# Patient Record
Sex: MALE | Race: WHITE | NOT HISPANIC OR LATINO | Employment: FULL TIME | URBAN - METROPOLITAN AREA
[De-identification: names, ages, dates, MRNs, and addresses within clinical notes are randomized per-mention and may not be internally consistent; named-entity substitution may affect disease eponyms.]

---

## 2023-05-29 DIAGNOSIS — K21.9 GASTROESOPHAGEAL REFLUX DISEASE, UNSPECIFIED WHETHER ESOPHAGITIS PRESENT: Primary | ICD-10-CM

## 2023-05-30 RX ORDER — OMEPRAZOLE 40 MG/1
CAPSULE, DELAYED RELEASE ORAL
Qty: 30 CAPSULE | Refills: 5 | Status: SHIPPED | OUTPATIENT
Start: 2023-05-30 | End: 2023-11-28

## 2023-06-12 ENCOUNTER — OFFICE VISIT (OUTPATIENT)
Dept: PRIMARY CARE | Facility: CLINIC | Age: 43
End: 2023-06-12
Payer: COMMERCIAL

## 2023-06-12 ENCOUNTER — APPOINTMENT (OUTPATIENT)
Dept: PRIMARY CARE | Facility: CLINIC | Age: 43
End: 2023-06-12
Payer: COMMERCIAL

## 2023-06-12 VITALS
OXYGEN SATURATION: 94 % | TEMPERATURE: 97.4 F | DIASTOLIC BLOOD PRESSURE: 79 MMHG | BODY MASS INDEX: 33.01 KG/M2 | SYSTOLIC BLOOD PRESSURE: 122 MMHG | WEIGHT: 233.38 LBS | HEART RATE: 79 BPM

## 2023-06-12 DIAGNOSIS — J20.9 ACUTE BRONCHITIS, UNSPECIFIED ORGANISM: Primary | ICD-10-CM

## 2023-06-12 PROBLEM — N50.89 TESTICULAR MASS: Status: ACTIVE | Noted: 2023-06-12

## 2023-06-12 PROBLEM — R13.10 DYSPHAGIA: Status: ACTIVE | Noted: 2023-06-12

## 2023-06-12 PROBLEM — R06.09 DYSPNEA ON EXERTION: Status: ACTIVE | Noted: 2023-06-12

## 2023-06-12 PROBLEM — R53.83 FATIGUE: Status: ACTIVE | Noted: 2023-06-12

## 2023-06-12 PROBLEM — K21.9 GE REFLUX: Status: ACTIVE | Noted: 2023-06-12

## 2023-06-12 PROBLEM — U07.1 COVID-19: Status: ACTIVE | Noted: 2023-06-12

## 2023-06-12 PROBLEM — R10.30 GROIN PAIN, UNSPECIFIED LATERALITY: Status: ACTIVE | Noted: 2023-06-12

## 2023-06-12 PROBLEM — J02.9 PHARYNGITIS: Status: ACTIVE | Noted: 2023-06-12

## 2023-06-12 PROBLEM — L98.9 CHANGING SKIN LESION: Status: ACTIVE | Noted: 2023-06-12

## 2023-06-12 PROBLEM — U07.1 COVID-19: Status: RESOLVED | Noted: 2023-06-12 | Resolved: 2023-06-12

## 2023-06-12 PROBLEM — R94.31 ABNORMAL EKG: Status: ACTIVE | Noted: 2023-06-12

## 2023-06-12 PROBLEM — L03.011 CELLULITIS OF RIGHT MIDDLE FINGER: Status: ACTIVE | Noted: 2023-06-12

## 2023-06-12 PROCEDURE — 99213 OFFICE O/P EST LOW 20 MIN: CPT | Performed by: NURSE PRACTITIONER

## 2023-06-12 PROCEDURE — 1036F TOBACCO NON-USER: CPT | Performed by: NURSE PRACTITIONER

## 2023-06-12 RX ORDER — CETIRIZINE HYDROCHLORIDE 10 MG/1
10 TABLET ORAL DAILY
Qty: 30 TABLET | Refills: 0 | Status: SHIPPED | OUTPATIENT
Start: 2023-06-12 | End: 2023-07-05

## 2023-06-12 RX ORDER — BENZONATATE 200 MG/1
200 CAPSULE ORAL 3 TIMES DAILY PRN
Qty: 42 CAPSULE | Refills: 0 | Status: SHIPPED | OUTPATIENT
Start: 2023-06-12 | End: 2023-07-12

## 2023-06-12 RX ORDER — ALBUTEROL SULFATE 90 UG/1
2 AEROSOL, METERED RESPIRATORY (INHALATION) EVERY 4 HOURS PRN
Qty: 8 G | Refills: 0 | Status: SHIPPED | OUTPATIENT
Start: 2023-06-12 | End: 2023-07-05

## 2023-06-12 RX ORDER — MULTIVITAMIN
1 TABLET ORAL DAILY
COMMUNITY
Start: 2021-07-15

## 2023-06-12 RX ORDER — AZITHROMYCIN 250 MG/1
TABLET, FILM COATED ORAL
Qty: 6 TABLET | Refills: 0 | Status: SHIPPED | OUTPATIENT
Start: 2023-06-12 | End: 2023-06-17

## 2023-06-12 ASSESSMENT — ENCOUNTER SYMPTOMS
SHORTNESS OF BREATH: 0
SINUS PRESSURE: 0
ABDOMINAL PAIN: 0
FATIGUE: 0
VOMITING: 0
COUGH: 0
NAUSEA: 0
SINUS PAIN: 0
FEVER: 0
DIARRHEA: 0
RHINORRHEA: 1
CHILLS: 0

## 2023-06-12 NOTE — PROGRESS NOTES
Subjective   Chief Complaint: URI (For 1 week. Negative COVID 6/12).    HPI   Mitch Ricks is a 42 y.o. male who presents for URI (For 1 week. Negative COVID 6/12).    Patient presents with nasal congestion, chest congestion, cough, sinus pressure.     Some SOB with exertion and chest tightness.  Patient denies fever, chills, nausea, vomiting, diarrhea, chest pain, heart palpations, or shortness of breath.     OTC has been using dayquil with minimal relief       Review of Systems   Constitutional:  Negative for chills, fatigue and fever.   HENT:  Positive for congestion and rhinorrhea. Negative for ear discharge, ear pain, postnasal drip, sinus pressure and sinus pain.    Respiratory:  Negative for cough and shortness of breath.    Cardiovascular:  Negative for chest pain.   Gastrointestinal:  Negative for abdominal pain, diarrhea, nausea and vomiting.       Objective   /79   Pulse 79   Temp 36.3 °C (97.4 °F) (Temporal)   Wt 106 kg (233 lb 6 oz)   SpO2 94%   BMI 33.01 kg/m²   BSA Body surface area is 2.3 meters squared.      Physical Exam  Constitutional:       Appearance: Normal appearance.   HENT:      Right Ear: Tympanic membrane normal.      Left Ear: Tympanic membrane normal.      Mouth/Throat:      Mouth: Mucous membranes are moist.      Pharynx: Posterior oropharyngeal erythema present. No oropharyngeal exudate.   Cardiovascular:      Rate and Rhythm: Normal rate and regular rhythm.   Pulmonary:      Effort: Pulmonary effort is normal.      Breath sounds: Wheezing present.   Abdominal:      General: Abdomen is flat.      Palpations: Abdomen is soft.   Neurological:      Mental Status: He is alert.       No visits with results within 1 Year(s) from this visit.   Latest known visit with results is:   [facility] Encounter on 01/07/2022   Component Date Value Ref Range Status    Testosterone 01/07/2022 421  240 - 1,000 ng/dL Final    Comment:  Nandrolone decanoate, 11 Beta-hydroxytestosterone,  androstenedione,   testosterone propionate and 11-keto-testosterone strongly cross    react with this test method.    Biotin interference may cause falsely elevated results. Patients   taking a Biotin dose of up to 5 mg/day should refrain from taking   Biotin for 24 hours before sample collection. Providers may contact   their local laboratory for further information.       Current Outpatient Medications on File Prior to Visit   Medication Sig Dispense Refill    multivitamin (Multiple Vitamins) tablet Take 1 tablet by mouth once daily.      omeprazole (PriLOSEC) 40 mg DR capsule TAKE 1 CAPSULE BY MOUTH EVERY DAY 30 capsule 5     No current facility-administered medications on file prior to visit.     No images are attached to the encounter.            Assessment/Plan   Problem List Items Addressed This Visit          Respiratory    Acute bronchitis - Primary     - Antibiotic sent to pharmacy- may start if symptoms persist in 2 days   - Albuterol inhaler sent to pharmacy   - Tessalon Perles sent to pharmacy  - Cool mist humidifier at nighttime    - Advised patient to push fluids   - patient to call if develops new or worsening symptoms          Relevant Medications    albuterol (Ventolin HFA) 90 mcg/actuation inhaler    azithromycin (Zithromax) 250 mg tablet    cetirizine (ZyrTEC) 10 mg tablet    benzonatate (Tessalon) 200 mg capsule

## 2023-06-12 NOTE — ASSESSMENT & PLAN NOTE
- Antibiotic sent to pharmacy- may start if symptoms persist in 2 days   - Albuterol inhaler sent to pharmacy   - Tessalon Perles sent to pharmacy  - Cool mist humidifier at nighttime    - Advised patient to push fluids   - patient to call if develops new or worsening symptoms

## 2023-07-04 DIAGNOSIS — J20.9 ACUTE BRONCHITIS, UNSPECIFIED ORGANISM: ICD-10-CM

## 2023-07-05 RX ORDER — ALBUTEROL SULFATE 90 UG/1
AEROSOL, METERED RESPIRATORY (INHALATION)
Qty: 6.7 G | Refills: 1 | Status: SHIPPED | OUTPATIENT
Start: 2023-07-05

## 2023-07-05 RX ORDER — CETIRIZINE HYDROCHLORIDE 10 MG/1
TABLET ORAL
Qty: 30 TABLET | Refills: 0 | Status: SHIPPED | OUTPATIENT
Start: 2023-07-05 | End: 2023-08-07

## 2023-08-06 DIAGNOSIS — J20.9 ACUTE BRONCHITIS, UNSPECIFIED ORGANISM: ICD-10-CM

## 2023-08-07 RX ORDER — CETIRIZINE HYDROCHLORIDE 10 MG/1
TABLET ORAL
Qty: 30 TABLET | Refills: 2 | Status: SHIPPED | OUTPATIENT
Start: 2023-08-07 | End: 2023-10-13

## 2023-10-12 DIAGNOSIS — J20.9 ACUTE BRONCHITIS, UNSPECIFIED ORGANISM: ICD-10-CM

## 2023-10-12 RX ORDER — HYDROCODONE BITARTRATE AND ACETAMINOPHEN 5; 325 MG/1; MG/1
1 TABLET ORAL EVERY 6 HOURS PRN
COMMUNITY
Start: 2023-10-12 | End: 2023-10-15

## 2023-10-13 RX ORDER — CETIRIZINE HYDROCHLORIDE 10 MG/1
TABLET ORAL
Qty: 90 TABLET | Refills: 1 | Status: SHIPPED | OUTPATIENT
Start: 2023-10-13

## 2023-11-28 DIAGNOSIS — K21.9 GASTROESOPHAGEAL REFLUX DISEASE, UNSPECIFIED WHETHER ESOPHAGITIS PRESENT: ICD-10-CM

## 2023-11-28 RX ORDER — OMEPRAZOLE 40 MG/1
CAPSULE, DELAYED RELEASE ORAL
Qty: 90 CAPSULE | Refills: 2 | Status: SHIPPED | OUTPATIENT
Start: 2023-11-28

## 2024-04-22 ENCOUNTER — TELEPHONE (OUTPATIENT)
Dept: PRIMARY CARE | Facility: CLINIC | Age: 44
End: 2024-04-22
Payer: COMMERCIAL

## 2024-04-22 DIAGNOSIS — Z00.00 HEALTH MAINTENANCE EXAMINATION: ICD-10-CM

## 2024-05-07 ENCOUNTER — OFFICE VISIT (OUTPATIENT)
Dept: PRIMARY CARE | Facility: CLINIC | Age: 44
End: 2024-05-07
Payer: COMMERCIAL

## 2024-05-07 ENCOUNTER — HOSPITAL ENCOUNTER (OUTPATIENT)
Dept: RADIOLOGY | Facility: CLINIC | Age: 44
Discharge: HOME | End: 2024-05-07
Payer: COMMERCIAL

## 2024-05-07 ENCOUNTER — LAB (OUTPATIENT)
Dept: LAB | Facility: LAB | Age: 44
End: 2024-05-07
Payer: COMMERCIAL

## 2024-05-07 VITALS
SYSTOLIC BLOOD PRESSURE: 118 MMHG | TEMPERATURE: 97.3 F | HEART RATE: 89 BPM | WEIGHT: 231 LBS | HEIGHT: 71 IN | DIASTOLIC BLOOD PRESSURE: 78 MMHG | BODY MASS INDEX: 32.34 KG/M2 | OXYGEN SATURATION: 95 %

## 2024-05-07 DIAGNOSIS — C62.12 MALIGNANT NEOPLASM OF DESCENDED LEFT TESTIS (MULTI): ICD-10-CM

## 2024-05-07 DIAGNOSIS — R10.30 GROIN PAIN, UNSPECIFIED LATERALITY: ICD-10-CM

## 2024-05-07 DIAGNOSIS — C62.12 MALIGNANT NEOPLASM OF DESCENDED LEFT TESTIS (MULTI): Primary | ICD-10-CM

## 2024-05-07 DIAGNOSIS — R10.84 GENERALIZED ABDOMINAL PAIN: ICD-10-CM

## 2024-05-07 PROBLEM — N50.89 TESTICULAR MASS: Status: RESOLVED | Noted: 2023-06-12 | Resolved: 2024-05-07

## 2024-05-07 PROCEDURE — 81003 URINALYSIS AUTO W/O SCOPE: CPT

## 2024-05-07 PROCEDURE — 76870 US EXAM SCROTUM: CPT

## 2024-05-07 PROCEDURE — 99213 OFFICE O/P EST LOW 20 MIN: CPT | Performed by: FAMILY MEDICINE

## 2024-05-07 ASSESSMENT — ENCOUNTER SYMPTOMS
DYSURIA: 0
CHOKING: 0
SPEECH DIFFICULTY: 0
ACTIVITY CHANGE: 0
DIARRHEA: 0
TREMORS: 0
FATIGUE: 0
WOUND: 0
ARTHRALGIAS: 0
CHILLS: 0
FLANK PAIN: 0
CARDIOVASCULAR NEGATIVE: 1
ABDOMINAL PAIN: 1
DEPRESSION: 0
LOSS OF SENSATION IN FEET: 0
NECK PAIN: 0
FREQUENCY: 0
ROS GI COMMENTS: SOFT STOOL.
OCCASIONAL FEELINGS OF UNSTEADINESS: 0
SHORTNESS OF BREATH: 0

## 2024-05-07 ASSESSMENT — PATIENT HEALTH QUESTIONNAIRE - PHQ9
10. IF YOU CHECKED OFF ANY PROBLEMS, HOW DIFFICULT HAVE THESE PROBLEMS MADE IT FOR YOU TO DO YOUR WORK, TAKE CARE OF THINGS AT HOME, OR GET ALONG WITH OTHER PEOPLE: NOT DIFFICULT AT ALL
SUM OF ALL RESPONSES TO PHQ9 QUESTIONS 1 AND 2: 0
1. LITTLE INTEREST OR PLEASURE IN DOING THINGS: NOT AT ALL
2. FEELING DOWN, DEPRESSED OR HOPELESS: NOT AT ALL

## 2024-05-07 NOTE — PATIENT INSTRUCTIONS
Evaluating for testicular pain ultrasound of the testicle being performed.    Because of the generalized abdominal pain discomfort we are going to do CT abdomen pelvis with contrast.    Urinalysis CMP CBC amylase lipase going to be performed.

## 2024-05-07 NOTE — PROGRESS NOTES
"Subjective   Patient ID: Mitch Ricks is a 43 y.o. male who presents for Groin Pain (Abdominal bloating).    Patient with history of testicular cancer has some abdominal bloating and discomfort.  Patient had bloating discomfort and generalized abdominal discomfort in the suprapubic area.  Patient had history of testicular cancer.    He has noticed a little bit of fullness in the area around the left testicle.    He had no troubles with significant pain or discomfort there is no redness no warmth.  Patient's bowel movements have change he is having soft stools approximately 2-3 times daily normally goes once a day with formed stool there is no change in diet.  He is get a good appetite.  There is been no blood in stool that he had noted.        Last month .  Pain in the area.    2 cycle donald boost.  Soft stool.  Last month.    No varibles.    No antibiotics.      Low energy,    Groin Pain  The patient's pertinent negatives include no penile discharge. Associated symptoms include abdominal pain. Pertinent negatives include no chills, diarrhea, dysuria, flank pain, frequency or shortness of breath.        Review of Systems   Constitutional:  Negative for activity change, chills and fatigue.   Respiratory:  Negative for choking and shortness of breath.    Cardiovascular: Negative.    Gastrointestinal:  Positive for abdominal pain. Negative for diarrhea.        Soft stool.       Genitourinary:  Negative for dysuria, flank pain, frequency and penile discharge.   Musculoskeletal:  Negative for arthralgias and neck pain.   Skin:  Negative for pallor and wound.   Neurological:  Negative for tremors and speech difficulty.       Objective   /78   Pulse 89   Temp 36.3 °C (97.3 °F)   Ht 1.803 m (5' 11\")   Wt 105 kg (231 lb)   SpO2 95%   BMI 32.22 kg/m²   BSA Body surface area is 2.29 meters squared.      Physical Exam  Constitutional:       Appearance: Normal appearance.   HENT:      Head: Normocephalic and " atraumatic.      Right Ear: Tympanic membrane normal.      Left Ear: Tympanic membrane normal.      Nose: Nose normal.   Eyes:      Pupils: Pupils are equal, round, and reactive to light.   Cardiovascular:      Rate and Rhythm: Normal rate and regular rhythm.   Pulmonary:      Effort: Pulmonary effort is normal.      Breath sounds: Normal breath sounds.   Abdominal:      General: There is no distension.      Tenderness: There is no abdominal tenderness.      Comments: Abdomen soft bowel sounds are present no tenderness noted.  Some tenderness noted just in the inferior aspect of left testicle.    No redness or warmth noted.    Left inguinal hernia noted easily reducible.  The abdomen soft flat bowel sounds present   Musculoskeletal:      Cervical back: Normal range of motion.   Neurological:      Mental Status: He is alert.       No visits with results within 1 Year(s) from this visit.   Latest known visit with results is:   [facility] Encounter on 01/07/2022   Component Date Value Ref Range Status    Testosterone 01/07/2022 421  240 - 1,000 ng/dL Final    Comment:  Nandrolone decanoate, 11 Beta-hydroxytestosterone, androstenedione,   testosterone propionate and 11-keto-testosterone strongly cross    react with this test method.    Biotin interference may cause falsely elevated results. Patients   taking a Biotin dose of up to 5 mg/day should refrain from taking   Biotin for 24 hours before sample collection. Providers may contact   their local laboratory for further information.       Current Outpatient Medications on File Prior to Visit   Medication Sig Dispense Refill    albuterol 90 mcg/actuation inhaler INHALE 2 PUFFS BY MOUTH EVERY 4 HOURS IF NEEDED FOR WHEEZING OR SHORTNESS OF BREATH 6.7 g 1    cetirizine (ZyrTEC) 10 mg tablet TAKE 1 TABLET BY MOUTH EVERY DAY 90 tablet 1    multivitamin (Multiple Vitamins) tablet Take 1 tablet by mouth once daily.      omeprazole (PriLOSEC) 40 mg DR capsule TAKE 1 CAPSULE BY  MOUTH EVERY DAY 90 capsule 2     No current facility-administered medications on file prior to visit.     No images are attached to the encounter.            Assessment/Plan   Problem List Items Addressed This Visit             ICD-10-CM    Groin pain, unspecified laterality R10.30    Malignant neoplasm of descended left testis (Multi) - Primary C62.12    Generalized abdominal pain R10.84

## 2024-05-08 ENCOUNTER — LAB (OUTPATIENT)
Dept: LAB | Facility: LAB | Age: 44
End: 2024-05-08
Payer: COMMERCIAL

## 2024-05-08 DIAGNOSIS — R10.84 GENERALIZED ABDOMINAL PAIN: ICD-10-CM

## 2024-05-08 DIAGNOSIS — Z00.00 HEALTH MAINTENANCE EXAMINATION: ICD-10-CM

## 2024-05-08 LAB
ALBUMIN SERPL BCP-MCNC: 4.3 G/DL (ref 3.4–5)
ALP SERPL-CCNC: 51 U/L (ref 33–120)
ALT SERPL W P-5'-P-CCNC: 30 U/L (ref 10–52)
AMYLASE SERPL-CCNC: 44 U/L (ref 29–103)
ANION GAP SERPL CALC-SCNC: 14 MMOL/L (ref 10–20)
APPEARANCE UR: CLEAR
AST SERPL W P-5'-P-CCNC: 25 U/L (ref 9–39)
BASOPHILS # BLD AUTO: 0.04 X10*3/UL (ref 0–0.1)
BASOPHILS NFR BLD AUTO: 0.5 %
BILIRUB SERPL-MCNC: 0.5 MG/DL (ref 0–1.2)
BILIRUB UR STRIP.AUTO-MCNC: NEGATIVE MG/DL
BUN SERPL-MCNC: 16 MG/DL (ref 6–23)
CALCIUM SERPL-MCNC: 9.3 MG/DL (ref 8.6–10.6)
CHLORIDE SERPL-SCNC: 104 MMOL/L (ref 98–107)
CHOLEST SERPL-MCNC: 156 MG/DL (ref 0–199)
CHOLESTEROL/HDL RATIO: 3.6
CO2 SERPL-SCNC: 26 MMOL/L (ref 21–32)
COLOR UR: YELLOW
CREAT SERPL-MCNC: 0.98 MG/DL (ref 0.5–1.3)
EGFRCR SERPLBLD CKD-EPI 2021: >90 ML/MIN/1.73M*2
EOSINOPHIL # BLD AUTO: 0.14 X10*3/UL (ref 0–0.7)
EOSINOPHIL NFR BLD AUTO: 1.8 %
ERYTHROCYTE [DISTWIDTH] IN BLOOD BY AUTOMATED COUNT: 13.1 % (ref 11.5–14.5)
GLUCOSE SERPL-MCNC: 95 MG/DL (ref 74–99)
GLUCOSE UR STRIP.AUTO-MCNC: NORMAL MG/DL
HCT VFR BLD AUTO: 44 % (ref 41–52)
HDLC SERPL-MCNC: 43.6 MG/DL
HGB BLD-MCNC: 14.8 G/DL (ref 13.5–17.5)
IMM GRANULOCYTES # BLD AUTO: 0.04 X10*3/UL (ref 0–0.7)
IMM GRANULOCYTES NFR BLD AUTO: 0.5 % (ref 0–0.9)
KETONES UR STRIP.AUTO-MCNC: NEGATIVE MG/DL
LDLC SERPL CALC-MCNC: 85 MG/DL
LEUKOCYTE ESTERASE UR QL STRIP.AUTO: NEGATIVE
LIPASE SERPL-CCNC: 24 U/L (ref 9–82)
LYMPHOCYTES # BLD AUTO: 2.14 X10*3/UL (ref 1.2–4.8)
LYMPHOCYTES NFR BLD AUTO: 27.8 %
MCH RBC QN AUTO: 28.3 PG (ref 26–34)
MCHC RBC AUTO-ENTMCNC: 33.6 G/DL (ref 32–36)
MCV RBC AUTO: 84 FL (ref 80–100)
MONOCYTES # BLD AUTO: 0.74 X10*3/UL (ref 0.1–1)
MONOCYTES NFR BLD AUTO: 9.6 %
NEUTROPHILS # BLD AUTO: 4.6 X10*3/UL (ref 1.2–7.7)
NEUTROPHILS NFR BLD AUTO: 59.8 %
NITRITE UR QL STRIP.AUTO: NEGATIVE
NON HDL CHOLESTEROL: 112 MG/DL (ref 0–149)
NRBC BLD-RTO: 0 /100 WBCS (ref 0–0)
PH UR STRIP.AUTO: 6 [PH]
PLATELET # BLD AUTO: 258 X10*3/UL (ref 150–450)
POTASSIUM SERPL-SCNC: 4.3 MMOL/L (ref 3.5–5.3)
PROT SERPL-MCNC: 7 G/DL (ref 6.4–8.2)
PROT UR STRIP.AUTO-MCNC: NEGATIVE MG/DL
PSA SERPL-MCNC: 1.74 NG/ML
RBC # BLD AUTO: 5.23 X10*6/UL (ref 4.5–5.9)
RBC # UR STRIP.AUTO: NEGATIVE /UL
SODIUM SERPL-SCNC: 140 MMOL/L (ref 136–145)
SP GR UR STRIP.AUTO: 1.02
TRIGL SERPL-MCNC: 139 MG/DL (ref 0–149)
TSH SERPL-ACNC: 2.94 MIU/L (ref 0.44–3.98)
UROBILINOGEN UR STRIP.AUTO-MCNC: NORMAL MG/DL
VLDL: 28 MG/DL (ref 0–40)
WBC # BLD AUTO: 7.7 X10*3/UL (ref 4.4–11.3)

## 2024-05-08 PROCEDURE — 82150 ASSAY OF AMYLASE: CPT

## 2024-05-08 PROCEDURE — 83690 ASSAY OF LIPASE: CPT

## 2024-05-08 PROCEDURE — 84153 ASSAY OF PSA TOTAL: CPT

## 2024-05-08 PROCEDURE — 80053 COMPREHEN METABOLIC PANEL: CPT

## 2024-05-08 PROCEDURE — 36415 COLL VENOUS BLD VENIPUNCTURE: CPT

## 2024-05-08 PROCEDURE — 85025 COMPLETE CBC W/AUTO DIFF WBC: CPT

## 2024-05-08 PROCEDURE — 80061 LIPID PANEL: CPT

## 2024-05-08 PROCEDURE — 84443 ASSAY THYROID STIM HORMONE: CPT

## 2024-05-22 ENCOUNTER — HOSPITAL ENCOUNTER (OUTPATIENT)
Dept: RADIOLOGY | Facility: CLINIC | Age: 44
Discharge: HOME | End: 2024-05-22
Payer: COMMERCIAL

## 2024-05-22 DIAGNOSIS — R10.84 GENERALIZED ABDOMINAL PAIN: ICD-10-CM

## 2024-05-22 PROCEDURE — 74177 CT ABD & PELVIS W/CONTRAST: CPT | Performed by: RADIOLOGY

## 2024-05-22 PROCEDURE — 74177 CT ABD & PELVIS W/CONTRAST: CPT

## 2024-05-22 PROCEDURE — 2550000001 HC RX 255 CONTRASTS: Performed by: FAMILY MEDICINE

## 2024-05-22 RX ADMIN — IOHEXOL 75 ML: 350 INJECTION, SOLUTION INTRAVENOUS at 08:30

## 2024-08-27 ENCOUNTER — APPOINTMENT (OUTPATIENT)
Dept: PRIMARY CARE | Facility: CLINIC | Age: 44
End: 2024-08-27
Payer: COMMERCIAL

## 2024-08-27 VITALS
HEIGHT: 71 IN | BODY MASS INDEX: 31.36 KG/M2 | DIASTOLIC BLOOD PRESSURE: 76 MMHG | SYSTOLIC BLOOD PRESSURE: 118 MMHG | WEIGHT: 224 LBS | TEMPERATURE: 97.1 F | OXYGEN SATURATION: 97 % | HEART RATE: 82 BPM

## 2024-08-27 DIAGNOSIS — Z00.00 ANNUAL PHYSICAL EXAM: Primary | ICD-10-CM

## 2024-08-27 DIAGNOSIS — R10.30 GROIN PAIN, UNSPECIFIED LATERALITY: ICD-10-CM

## 2024-08-27 DIAGNOSIS — K21.9 GASTROESOPHAGEAL REFLUX DISEASE, UNSPECIFIED WHETHER ESOPHAGITIS PRESENT: ICD-10-CM

## 2024-08-27 DIAGNOSIS — F43.20 ADJUSTMENT DISORDER, UNSPECIFIED TYPE: ICD-10-CM

## 2024-08-27 DIAGNOSIS — C62.12 MALIGNANT NEOPLASM OF DESCENDED LEFT TESTIS (MULTI): ICD-10-CM

## 2024-08-27 PROBLEM — R06.09 DYSPNEA ON EXERTION: Status: RESOLVED | Noted: 2023-06-12 | Resolved: 2024-08-27

## 2024-08-27 PROBLEM — J02.9 PHARYNGITIS: Status: RESOLVED | Noted: 2023-06-12 | Resolved: 2024-08-27

## 2024-08-27 PROBLEM — J20.9 ACUTE BRONCHITIS: Status: RESOLVED | Noted: 2023-06-12 | Resolved: 2024-08-27

## 2024-08-27 PROBLEM — R10.84 GENERALIZED ABDOMINAL PAIN: Status: RESOLVED | Noted: 2024-05-07 | Resolved: 2024-08-27

## 2024-08-27 PROBLEM — R53.83 FATIGUE: Status: RESOLVED | Noted: 2023-06-12 | Resolved: 2024-08-27

## 2024-08-27 PROBLEM — R13.10 DYSPHAGIA: Status: RESOLVED | Noted: 2023-06-12 | Resolved: 2024-08-27

## 2024-08-27 PROCEDURE — 3008F BODY MASS INDEX DOCD: CPT | Performed by: FAMILY MEDICINE

## 2024-08-27 PROCEDURE — 99396 PREV VISIT EST AGE 40-64: CPT | Performed by: FAMILY MEDICINE

## 2024-08-27 ASSESSMENT — ENCOUNTER SYMPTOMS
ARTHRALGIAS: 0
NERVOUS/ANXIOUS: 0
CONFUSION: 0
ACTIVITY CHANGE: 0
DIFFICULTY URINATING: 0
POLYDIPSIA: 0
LOSS OF SENSATION IN FEET: 0
NEUROLOGICAL NEGATIVE: 1
FACIAL ASYMMETRY: 0
DIAPHORESIS: 0
BRUISES/BLEEDS EASILY: 0
AGITATION: 0
PALPITATIONS: 0
FLANK PAIN: 0
NAUSEA: 0
SORE THROAT: 0
NUMBNESS: 0
OCCASIONAL FEELINGS OF UNSTEADINESS: 0
ABDOMINAL DISTENTION: 0
STRIDOR: 0
WOUND: 0
SINUS PAIN: 0
COUGH: 0
EYE REDNESS: 0
EYE ITCHING: 0
SEIZURES: 0
CONSTIPATION: 0
EYE PAIN: 0
HEADACHES: 0
SLEEP DISTURBANCE: 0
DIZZINESS: 0
APPETITE CHANGE: 0
EYE DISCHARGE: 0
SPEECH DIFFICULTY: 0
CHEST TIGHTNESS: 0
VOMITING: 0
DEPRESSION: 0
DYSURIA: 0
UNEXPECTED WEIGHT CHANGE: 0
TROUBLE SWALLOWING: 0
MYALGIAS: 0
BACK PAIN: 0
ABDOMINAL PAIN: 0
ADENOPATHY: 0
LIGHT-HEADEDNESS: 0
HALLUCINATIONS: 0
RESPIRATORY NEGATIVE: 1
DECREASED CONCENTRATION: 0
FREQUENCY: 0
FATIGUE: 0
VOICE CHANGE: 0
BLOOD IN STOOL: 0
SHORTNESS OF BREATH: 0
DIARRHEA: 0
CARDIOVASCULAR NEGATIVE: 1
ANAL BLEEDING: 0
EYES NEGATIVE: 1
SINUS PRESSURE: 0

## 2024-08-27 ASSESSMENT — PATIENT HEALTH QUESTIONNAIRE - PHQ9
9. THOUGHTS THAT YOU WOULD BE BETTER OFF DEAD, OR OF HURTING YOURSELF: NOT AT ALL
8. MOVING OR SPEAKING SO SLOWLY THAT OTHER PEOPLE COULD HAVE NOTICED. OR THE OPPOSITE, BEING SO FIGETY OR RESTLESS THAT YOU HAVE BEEN MOVING AROUND A LOT MORE THAN USUAL: NOT AT ALL
7. TROUBLE CONCENTRATING ON THINGS, SUCH AS READING THE NEWSPAPER OR WATCHING TELEVISION: MORE THAN HALF THE DAYS
4. FEELING TIRED OR HAVING LITTLE ENERGY: MORE THAN HALF THE DAYS
5. POOR APPETITE OR OVEREATING: NOT AT ALL
6. FEELING BAD ABOUT YOURSELF - OR THAT YOU ARE A FAILURE OR HAVE LET YOURSELF OR YOUR FAMILY DOWN: NOT AT ALL
SUM OF ALL RESPONSES TO PHQ9 QUESTIONS 1 AND 2: 3
SUM OF ALL RESPONSES TO PHQ QUESTIONS 1-9: 7
10. IF YOU CHECKED OFF ANY PROBLEMS, HOW DIFFICULT HAVE THESE PROBLEMS MADE IT FOR YOU TO DO YOUR WORK, TAKE CARE OF THINGS AT HOME, OR GET ALONG WITH OTHER PEOPLE: SOMEWHAT DIFFICULT
1. LITTLE INTEREST OR PLEASURE IN DOING THINGS: MORE THAN HALF THE DAYS
2. FEELING DOWN, DEPRESSED OR HOPELESS: SEVERAL DAYS
3. TROUBLE FALLING OR STAYING ASLEEP OR SLEEPING TOO MUCH: NOT AT ALL

## 2024-08-27 ASSESSMENT — COLUMBIA-SUICIDE SEVERITY RATING SCALE - C-SSRS
6. HAVE YOU EVER DONE ANYTHING, STARTED TO DO ANYTHING, OR PREPARED TO DO ANYTHING TO END YOUR LIFE?: NO
1. IN THE PAST MONTH, HAVE YOU WISHED YOU WERE DEAD OR WISHED YOU COULD GO TO SLEEP AND NOT WAKE UP?: NO
2. HAVE YOU ACTUALLY HAD ANY THOUGHTS OF KILLING YOURSELF?: NO

## 2024-08-27 NOTE — PATIENT INSTRUCTIONS
Overall doing well.    Would like to know how you are doing in the next 1 to 2 months with energy with clarity of thought.    If any troubles with depression or anxiety please call and let me know.    Labs have been reviewed with you.    May try turmeric or glucosamine/chondroitin to try with joint pain discomfort.  If not improving please let me know.    COVID has caused issues with brain fog and if should have continued trouble we will have you see one of the long haul COVID clinic.

## 2024-08-27 NOTE — PROGRESS NOTES
Subjective   Patient ID: Mitch Ricks is a 44 y.o. male who presents for Annual Exam.    Things are alright.  Patient has had some issue after COVID which is not thinking quite as clearly describes as a brain fog.  It is getting better.    Feels like it is caused him to be in a little bit of a right for short period but he feels better now he is exercising regularly watching diet closely does not drink alcohol on any kind of regular basis.  He said no fever no chills no night sweats.    Patient did follow-up remotely with Dr. Peterson the urologist felt that the kidney cyst was stable did not need to be seen but he is can make an appointment to see him anyway he is his urologist.  He has had history of testicular cancer    Some memory fog..    Little rut.  Out of                  Alcohol intake: none  Caffeine intake: Shake in am  Exercise: sprints,  high intensity work out. Some strengthening    Using inhaler    Last Colonoscopy: In the next year  Last Pap smear: N/A  Mammogram:N/A  Last Dexa scan:N/A    Shingles vaccine: N/A  TdaP vaccine:     Review of Systems   Constitutional:  Negative for activity change, appetite change, diaphoresis, fatigue and unexpected weight change.   HENT: Negative.  Negative for congestion, dental problem, ear discharge, ear pain, hearing loss, mouth sores, nosebleeds, postnasal drip, sinus pressure, sinus pain, sore throat, tinnitus, trouble swallowing and voice change.    Eyes: Negative.  Negative for pain, discharge, redness, itching and visual disturbance.        Wearing glasses   Respiratory: Negative.  Negative for cough, chest tightness, shortness of breath and stridor.    Cardiovascular: Negative.  Negative for chest pain, palpitations and leg swelling.   Gastrointestinal:  Negative for abdominal distention, abdominal pain, anal bleeding, blood in stool, constipation, diarrhea, nausea and vomiting.   Endocrine: Negative for cold intolerance, heat intolerance, polydipsia and  "polyuria.   Genitourinary:  Negative for difficulty urinating, dysuria, enuresis, flank pain and frequency.   Musculoskeletal:  Negative for arthralgias, back pain and myalgias.   Skin:  Negative for rash and wound.   Allergic/Immunologic: Negative for environmental allergies, food allergies and immunocompromised state.   Neurological: Negative.  Negative for dizziness, seizures, facial asymmetry, speech difficulty, light-headedness, numbness and headaches.        Brain fog   Hematological:  Negative for adenopathy. Does not bruise/bleed easily.   Psychiatric/Behavioral:  Negative for agitation, behavioral problems, confusion, decreased concentration, hallucinations, sleep disturbance and suicidal ideas. The patient is not nervous/anxious.         Little rut       Objective   /76   Pulse 82   Temp 36.2 °C (97.1 °F)   Ht 1.803 m (5' 11\")   Wt 102 kg (224 lb)   SpO2 97%   BMI 31.24 kg/m²   BSA Body surface area is 2.26 meters squared.      Physical Exam  Constitutional:       General: He is not in acute distress.     Appearance: Normal appearance. He is not ill-appearing or toxic-appearing.   HENT:      Head: Normocephalic.      Right Ear: Tympanic membrane normal.      Left Ear: Tympanic membrane normal.      Nose: Nose normal.   Eyes:      Extraocular Movements: Extraocular movements intact.      Conjunctiva/sclera: Conjunctivae normal.      Pupils: Pupils are equal, round, and reactive to light.   Cardiovascular:      Rate and Rhythm: Normal rate and regular rhythm.      Pulses: Normal pulses.      Heart sounds: Normal heart sounds.   Pulmonary:      Effort: Pulmonary effort is normal.      Breath sounds: Normal breath sounds. No wheezing or rhonchi.   Abdominal:      General: Abdomen is flat. Bowel sounds are normal. There is no distension.      Palpations: Abdomen is soft.      Tenderness: There is no abdominal tenderness. There is no right CVA tenderness or rebound.      Hernia: No hernia is present. "   Musculoskeletal:         General: Normal range of motion.      Cervical back: Normal range of motion.      Right lower leg: No edema.   Skin:     General: Skin is warm and dry.      Capillary Refill: Capillary refill takes less than 2 seconds.      Findings: No bruising.   Neurological:      General: No focal deficit present.      Mental Status: He is alert and oriented to person, place, and time.      Cranial Nerves: No cranial nerve deficit.      Sensory: No sensory deficit.      Motor: No weakness.      Coordination: Coordination normal.      Gait: Gait normal.      Deep Tendon Reflexes: Reflexes normal.   Psychiatric:         Mood and Affect: Mood normal.         Behavior: Behavior normal.         Thought Content: Thought content normal.         Judgment: Judgment normal.       Lab on 05/08/2024   Component Date Value Ref Range Status    WBC 05/08/2024 7.7  4.4 - 11.3 x10*3/uL Final    nRBC 05/08/2024 0.0  0.0 - 0.0 /100 WBCs Final    RBC 05/08/2024 5.23  4.50 - 5.90 x10*6/uL Final    Hemoglobin 05/08/2024 14.8  13.5 - 17.5 g/dL Final    Hematocrit 05/08/2024 44.0  41.0 - 52.0 % Final    MCV 05/08/2024 84  80 - 100 fL Final    MCH 05/08/2024 28.3  26.0 - 34.0 pg Final    MCHC 05/08/2024 33.6  32.0 - 36.0 g/dL Final    RDW 05/08/2024 13.1  11.5 - 14.5 % Final    Platelets 05/08/2024 258  150 - 450 x10*3/uL Final    Neutrophils % 05/08/2024 59.8  40.0 - 80.0 % Final    Immature Granulocytes %, Automated 05/08/2024 0.5  0.0 - 0.9 % Final    Immature Granulocyte Count (IG) includes promyelocytes, myelocytes and metamyelocytes but does not include bands. Percent differential counts (%) should be interpreted in the context of the absolute cell counts (cells/UL).    Lymphocytes % 05/08/2024 27.8  13.0 - 44.0 % Final    Monocytes % 05/08/2024 9.6  2.0 - 10.0 % Final    Eosinophils % 05/08/2024 1.8  0.0 - 6.0 % Final    Basophils % 05/08/2024 0.5  0.0 - 2.0 % Final    Neutrophils Absolute 05/08/2024 4.60  1.20 - 7.70  x10*3/uL Final    Percent differential counts (%) should be interpreted in the context of the absolute cell counts (cells/uL).    Immature Granulocytes Absolute, Au* 05/08/2024 0.04  0.00 - 0.70 x10*3/uL Final    Lymphocytes Absolute 05/08/2024 2.14  1.20 - 4.80 x10*3/uL Final    Monocytes Absolute 05/08/2024 0.74  0.10 - 1.00 x10*3/uL Final    Eosinophils Absolute 05/08/2024 0.14  0.00 - 0.70 x10*3/uL Final    Basophils Absolute 05/08/2024 0.04  0.00 - 0.10 x10*3/uL Final    Glucose 05/08/2024 95  74 - 99 mg/dL Final    Sodium 05/08/2024 140  136 - 145 mmol/L Final    Potassium 05/08/2024 4.3  3.5 - 5.3 mmol/L Final    Chloride 05/08/2024 104  98 - 107 mmol/L Final    Bicarbonate 05/08/2024 26  21 - 32 mmol/L Final    Anion Gap 05/08/2024 14  10 - 20 mmol/L Final    Urea Nitrogen 05/08/2024 16  6 - 23 mg/dL Final    Creatinine 05/08/2024 0.98  0.50 - 1.30 mg/dL Final    eGFR 05/08/2024 >90  >60 mL/min/1.73m*2 Final    Calculations of estimated GFR are performed using the 2021 CKD-EPI Study Refit equation without the race variable for the IDMS-Traceable creatinine methods.  https://jasn.asnjournals.org/content/early/2021/09/22/ASN.0510425972    Calcium 05/08/2024 9.3  8.6 - 10.6 mg/dL Final    Albumin 05/08/2024 4.3  3.4 - 5.0 g/dL Final    Alkaline Phosphatase 05/08/2024 51  33 - 120 U/L Final    Total Protein 05/08/2024 7.0  6.4 - 8.2 g/dL Final    AST 05/08/2024 25  9 - 39 U/L Final    Bilirubin, Total 05/08/2024 0.5  0.0 - 1.2 mg/dL Final    ALT 05/08/2024 30  10 - 52 U/L Final    Patients treated with Sulfasalazine may generate falsely decreased results for ALT.    Thyroid Stimulating Hormone 05/08/2024 2.94  0.44 - 3.98 mIU/L Final    Cholesterol 05/08/2024 156  0 - 199 mg/dL Final          Age      Desirable   Borderline High   High     0-19 Y     0 - 169       170 - 199     >/= 200    20-24 Y     0 - 189       190 - 224     >/= 225         >24 Y     0 - 199       200 - 239     >/= 240   **All ranges are  based on fasting samples. Specific   therapeutic targets will vary based on patient-specific   cardiac risk.    Pediatric guidelines reference:Pediatrics 2011, 128(S5).Adult guidelines reference: NCEP ATPIII Guidelines,SYDNIE 2001, 258:2486-97    Venipuncture immediately after or during the administration of Metamizole may lead to falsely low results. Testing should be performed immediately prior to Metamizole dosing.    HDL-Cholesterol 05/08/2024 43.6  mg/dL Final      Age       Very Low   Low     Normal    High    0-19 Y    < 35      < 40     40-45     ----  20-24 Y    ----     < 40      >45      ----        >24 Y      ----     < 40     40-60      >60      Cholesterol/HDL Ratio 05/08/2024 3.6   Final      Ref Values  Desirable  < 3.4  High Risk  > 5.0    LDL Calculated 05/08/2024 85  <=99 mg/dL Final                                Near   Borderline      AGE      Desirable  Optimal    High     High     Very High     0-19 Y     0 - 109     ---    110-129   >/= 130     ----    20-24 Y     0 - 119     ---    120-159   >/= 160     ----      >24 Y     0 -  99   100-129  130-159   160-189     >/=190      VLDL 05/08/2024 28  0 - 40 mg/dL Final    Triglycerides 05/08/2024 139  0 - 149 mg/dL Final       Age         Desirable   Borderline High   High     Very High   0 D-90 D    19 - 174         ----         ----        ----  91 D- 9 Y     0 -  74        75 -  99     >/= 100      ----    10-19 Y     0 -  89        90 - 129     >/= 130      ----    20-24 Y     0 - 114       115 - 149     >/= 150      ----         >24 Y     0 - 149       150 - 199    200- 499    >/= 500    Venipuncture immediately after or during the administration of Metamizole may lead to falsely low results. Testing should be performed immediately prior to Metamizole dosing.    Non HDL Cholesterol 05/08/2024 112  0 - 149 mg/dL Final          Age       Desirable   Borderline High   High     Very High     0-19 Y     0 - 119       120 - 144     >/= 145    >/=  160    20-24 Y     0 - 149       150 - 189     >/= 190      ----         >24 Y    30 mg/dL above LDL Cholesterol goal      Prostate Specific AG 05/08/2024 1.74  <=4.00 ng/mL Final    Amylase 05/08/2024 44  29 - 103 U/L Final    Lipase 05/08/2024 24  9 - 82 U/L Final   Lab on 05/07/2024   Component Date Value Ref Range Status    Color, Urine 05/07/2024 Yellow  Light-Yellow, Yellow, Dark-Yellow Final    Appearance, Urine 05/07/2024 Clear  Clear Final    Specific Gravity, Urine 05/07/2024 1.024  1.005 - 1.035 Final    pH, Urine 05/07/2024 6.0  5.0, 5.5, 6.0, 6.5, 7.0, 7.5, 8.0 Final    Protein, Urine 05/07/2024 NEGATIVE  NEGATIVE, 10 (TRACE), 20 (TRACE) mg/dL Final    Glucose, Urine 05/07/2024 Normal  Normal mg/dL Final    Blood, Urine 05/07/2024 NEGATIVE  NEGATIVE Final    Ketones, Urine 05/07/2024 NEGATIVE  NEGATIVE mg/dL Final    Bilirubin, Urine 05/07/2024 NEGATIVE  NEGATIVE Final    Urobilinogen, Urine 05/07/2024 Normal  Normal mg/dL Final    Nitrite, Urine 05/07/2024 NEGATIVE  NEGATIVE Final    Leukocyte Esterase, Urine 05/07/2024 NEGATIVE  NEGATIVE Final     Current Outpatient Medications on File Prior to Visit   Medication Sig Dispense Refill    albuterol 90 mcg/actuation inhaler INHALE 2 PUFFS BY MOUTH EVERY 4 HOURS IF NEEDED FOR WHEEZING OR SHORTNESS OF BREATH 6.7 g 1    cetirizine (ZyrTEC) 10 mg tablet TAKE 1 TABLET BY MOUTH EVERY DAY 90 tablet 1    multivitamin (Multiple Vitamins) tablet Take 1 tablet by mouth once daily.      omeprazole (PriLOSEC) 40 mg DR capsule TAKE 1 CAPSULE BY MOUTH EVERY DAY 90 capsule 2     No current facility-administered medications on file prior to visit.     No images are attached to the encounter.            Assessment/Plan   Problem List Items Addressed This Visit             ICD-10-CM    GE reflux K21.9    RESOLVED: Groin pain, unspecified laterality R10.30    Malignant neoplasm of descended left testis (Multi) C62.12     Has been following up with urology         Annual  physical exam - Primary Z00.00     Other Visit Diagnoses         Codes    Adjustment disorder, unspecified type     F43.20

## 2024-08-28 PROBLEM — R94.31 ABNORMAL EKG: Status: RESOLVED | Noted: 2023-06-12 | Resolved: 2024-08-28

## 2024-08-28 PROBLEM — L98.9 CHANGING SKIN LESION: Status: RESOLVED | Noted: 2023-06-12 | Resolved: 2024-08-28

## 2024-08-28 PROBLEM — L03.011 CELLULITIS OF RIGHT MIDDLE FINGER: Status: RESOLVED | Noted: 2023-06-12 | Resolved: 2024-08-28

## 2024-08-28 PROBLEM — Z00.00 ANNUAL PHYSICAL EXAM: Status: ACTIVE | Noted: 2024-08-28

## 2024-09-07 DIAGNOSIS — K21.9 GASTROESOPHAGEAL REFLUX DISEASE, UNSPECIFIED WHETHER ESOPHAGITIS PRESENT: ICD-10-CM

## 2024-09-07 RX ORDER — OMEPRAZOLE 40 MG/1
CAPSULE, DELAYED RELEASE ORAL
Qty: 30 CAPSULE | Refills: 0 | Status: SHIPPED | OUTPATIENT
Start: 2024-09-07

## 2024-09-30 DIAGNOSIS — K21.9 GASTROESOPHAGEAL REFLUX DISEASE, UNSPECIFIED WHETHER ESOPHAGITIS PRESENT: ICD-10-CM

## 2024-09-30 RX ORDER — OMEPRAZOLE 40 MG/1
CAPSULE, DELAYED RELEASE ORAL
Qty: 90 CAPSULE | Refills: 1 | Status: SHIPPED | OUTPATIENT
Start: 2024-09-30

## 2025-04-06 DIAGNOSIS — K21.9 GASTROESOPHAGEAL REFLUX DISEASE, UNSPECIFIED WHETHER ESOPHAGITIS PRESENT: ICD-10-CM

## 2025-04-07 RX ORDER — OMEPRAZOLE 40 MG/1
CAPSULE, DELAYED RELEASE ORAL
Qty: 90 CAPSULE | Refills: 1 | Status: SHIPPED | OUTPATIENT
Start: 2025-04-07